# Patient Record
Sex: FEMALE | Race: WHITE | NOT HISPANIC OR LATINO | Employment: FULL TIME | URBAN - METROPOLITAN AREA
[De-identification: names, ages, dates, MRNs, and addresses within clinical notes are randomized per-mention and may not be internally consistent; named-entity substitution may affect disease eponyms.]

---

## 2019-05-13 ENCOUNTER — HOSPITAL ENCOUNTER (OUTPATIENT)
Dept: PREADMISSION TESTING | Facility: HOSPITAL | Age: 44
Discharge: HOME | End: 2019-05-13
Attending: SURGERY | Admitting: SURGERY

## 2019-10-04 PROBLEM — Z83.3 FAMILY HISTORY OF DIABETES MELLITUS: Status: ACTIVE | Noted: 2019-10-04

## 2019-10-04 RX ORDER — ESCITALOPRAM OXALATE 20 MG/1
1 TABLET ORAL DAILY
COMMUNITY
Start: 2016-09-19

## 2019-10-04 RX ORDER — IBUPROFEN AND FAMOTIDINE 26.6; 8 MG/1; MG/1
TABLET, FILM COATED ORAL
COMMUNITY
Start: 2016-09-19 | End: 2019-10-07

## 2019-10-04 RX ORDER — HYDROCHLOROTHIAZIDE 50 MG/1
1 TABLET ORAL DAILY
COMMUNITY
Start: 2016-09-19 | End: 2019-10-07

## 2019-10-04 RX ORDER — TRAZODONE HYDROCHLORIDE 100 MG/1
1 TABLET ORAL DAILY
COMMUNITY
Start: 2016-09-19

## 2019-10-07 ENCOUNTER — OFFICE VISIT (OUTPATIENT)
Dept: CARDIOLOGY | Facility: CLINIC | Age: 44
End: 2019-10-07

## 2019-10-07 VITALS
HEART RATE: 76 BPM | HEIGHT: 64 IN | BODY MASS INDEX: 42.85 KG/M2 | WEIGHT: 251 LBS | DIASTOLIC BLOOD PRESSURE: 79 MMHG | SYSTOLIC BLOOD PRESSURE: 136 MMHG

## 2019-10-07 DIAGNOSIS — E11.9 TYPE 2 DIABETES MELLITUS WITHOUT COMPLICATION, WITHOUT LONG-TERM CURRENT USE OF INSULIN (HCC): ICD-10-CM

## 2019-10-07 DIAGNOSIS — R07.2 PRECORDIAL PAIN: ICD-10-CM

## 2019-10-07 DIAGNOSIS — I49.3 PVC'S (PREMATURE VENTRICULAR CONTRACTIONS): ICD-10-CM

## 2019-10-07 DIAGNOSIS — R00.2 PALPITATIONS: ICD-10-CM

## 2019-10-07 DIAGNOSIS — R06.02 SHORTNESS OF BREATH: ICD-10-CM

## 2019-10-07 DIAGNOSIS — I10 ESSENTIAL HYPERTENSION: Primary | ICD-10-CM

## 2019-10-07 PROCEDURE — 99204 OFFICE O/P NEW MOD 45 MIN: CPT | Performed by: INTERNAL MEDICINE

## 2019-10-07 RX ORDER — POTASSIUM CHLORIDE 750 MG/1
CAPSULE, EXTENDED RELEASE ORAL
COMMUNITY
End: 2019-10-07

## 2019-10-07 RX ORDER — PROMETHAZINE HCL/CODEINE 6.25-10/5
5 SYRUP ORAL EVERY 6 HOURS
COMMUNITY
End: 2019-10-07

## 2019-10-07 RX ORDER — SPIRONOLACTONE 100 MG/1
TABLET, FILM COATED ORAL
COMMUNITY
End: 2019-10-07

## 2019-10-07 RX ORDER — ONDANSETRON 4 MG/1
TABLET, FILM COATED ORAL EVERY 8 HOURS
COMMUNITY
End: 2019-10-07

## 2019-10-07 RX ORDER — FLUCONAZOLE 200 MG/1
TABLET ORAL
COMMUNITY
End: 2019-10-07

## 2019-10-07 RX ORDER — BUMETANIDE 1 MG/1
TABLET ORAL
COMMUNITY
End: 2019-10-07

## 2019-10-07 RX ORDER — OMEPRAZOLE 20 MG/1
CAPSULE, DELAYED RELEASE ORAL
COMMUNITY

## 2019-10-07 RX ORDER — KETOROLAC TROMETHAMINE 30 MG/ML
1 INJECTION, SOLUTION INTRAMUSCULAR; INTRAVENOUS EVERY 6 HOURS
COMMUNITY
End: 2019-10-07

## 2019-10-07 RX ORDER — AMOXICILLIN AND CLAVULANATE POTASSIUM 500; 125 MG/1; MG/1
TABLET, FILM COATED ORAL
COMMUNITY
End: 2019-10-07

## 2019-10-07 RX ORDER — METHYLPREDNISOLONE 4 MG/1
TABLET ORAL
COMMUNITY
End: 2019-10-07

## 2019-10-07 RX ORDER — CEFTRIAXONE 1 G/1
1 INJECTION, POWDER, FOR SOLUTION INTRAMUSCULAR; INTRAVENOUS
COMMUNITY
End: 2019-10-07

## 2019-10-07 RX ORDER — FUROSEMIDE 20 MG/1
TABLET ORAL
COMMUNITY

## 2019-10-07 RX ORDER — CLINDAMYCIN PHOSPHATE 20 MG/G
CREAM VAGINAL
COMMUNITY
End: 2019-10-07

## 2019-10-07 RX ORDER — INDOMETHACIN 75 MG/1
CAPSULE, EXTENDED RELEASE ORAL EVERY 12 HOURS SCHEDULED
COMMUNITY
End: 2019-10-07

## 2019-10-07 RX ORDER — LIDOCAINE HYDROCHLORIDE 20 MG/ML
JELLY TOPICAL EVERY 6 HOURS SCHEDULED
COMMUNITY
End: 2019-10-07

## 2019-10-07 RX ORDER — TRIAMTERENE AND HYDROCHLOROTHIAZIDE 37.5; 25 MG/1; MG/1
TABLET ORAL
COMMUNITY
End: 2019-10-07

## 2019-10-07 RX ORDER — GENTAMICIN SULFATE 40 MG/ML
80 INJECTION, SOLUTION INTRAMUSCULAR; INTRAVENOUS
COMMUNITY
End: 2019-10-07

## 2019-10-07 RX ORDER — ACYCLOVIR 50 MG/G
OINTMENT TOPICAL
COMMUNITY

## 2019-10-07 RX ORDER — AZITHROMYCIN 250 MG/1
TABLET, FILM COATED ORAL
Refills: 0 | COMMUNITY
Start: 2019-08-01 | End: 2019-10-07

## 2019-10-07 RX ORDER — METHYLPREDNISOLONE SODIUM SUCCINATE 125 MG/2ML
125 INJECTION, POWDER, LYOPHILIZED, FOR SOLUTION INTRAMUSCULAR; INTRAVENOUS
COMMUNITY
End: 2019-10-07

## 2019-10-07 NOTE — PROGRESS NOTES
Date of Office Visit: 10/07/2019  Encounter Provider: Dr Viet Tomlinson  Place of Service: [facility] CARDIOLOGY [place]  Patient Name: Josiah Littlejohn  :1975  Nakul Harris MD    Chief Complaint   Patient presents with   •  Chest pain  Shortness of breath  Borderline diabetes mellitus     consult   • Palpitations     History of Present Illness    I am pleased to see Mrs. Littlejohn in my office today as a new consultation.    As you know, patient is 44-year-old white female whose past medical history is significant for gastroesophageal reflux disease, borderline diabetes mellitus, who is referred to me for symptom of palpitation and chest pain.    Patient reports that from last few weeks he is having episode of palpitation described as a racing of the heart.  They lasts for 5 to 10 minutes.  It is associated with dizziness and lightheadedness.  Patient also complained of chest discomfort after the episode is resolved.  Patient denies any orthopnea or PND.  Patient does complain of shortness of breath on exertion.  Patient denies any syncope or presyncope.    Patient has leg edema.  Patient is on Lasix.    Holter monitor done recently showed predominantly sinus rhythm.  Patient had 2575 premature PVCs.  There is no SVT or VT noted.  No episode of atrial fibrillation.    Patient is having symptom of palpitation along with chest discomfort.  Holter monitor demonstrated frequent PVCs.  I would recommend that patient should proceed with echocardiogram and stress test.  Further recommendation after results of these test.    No past medical history on file.      No past surgical history on file.        Current Outpatient Medications:   •  acyclovir (ZOVIRAX) 5 % ointment, acyclovir 5 % topical ointment  APPLY TO THE AFFECTED AREA(S) BY TOPICAL ROUTE EVERY 3 HOURS 6 TIMES PER DAY, Disp: , Rfl:   •  escitalopram (LEXAPRO) 20 MG tablet, 1 tablet Daily., Disp: , Rfl:   •  furosemide (LASIX) 20 MG tablet, furosemide  "20 mg tablet  TAKE ONE TABLET BY MOUTH EVERY DAY, Disp: , Rfl:   •  metFORMIN (GLUCOPHAGE) 500 MG tablet, Take 500 mg by mouth 2 (Two) Times a Day., Disp: , Rfl: 2  •  omeprazole (PRILOSEC) 20 MG capsule, Prilosec 20 mg capsule,delayed release  Take 1 capsule every day by oral route., Disp: , Rfl:   •  traZODone (DESYREL) 100 MG tablet, 1 tablet Daily., Disp: , Rfl:       Social History     Socioeconomic History   • Marital status:      Spouse name: Not on file   • Number of children: Not on file   • Years of education: Not on file   • Highest education level: Not on file   Tobacco Use   • Smoking status: Never Smoker   Substance and Sexual Activity   • Alcohol use: Yes     Frequency: Monthly or less   • Drug use: No   • Sexual activity: Defer         Review of Systems   Constitution: Negative for chills and fever.   HENT: Negative for ear discharge and nosebleeds.    Eyes: Negative for discharge and redness.   Cardiovascular: Positive for chest pain and palpitations. Negative for orthopnea, paroxysmal nocturnal dyspnea and syncope.   Respiratory: Positive for shortness of breath. Negative for cough and wheezing.    Endocrine: Negative for heat intolerance.   Skin: Negative for rash.   Musculoskeletal: Negative for arthritis and myalgias.   Gastrointestinal: Negative for abdominal pain, melena, nausea and vomiting.   Genitourinary: Negative for dysuria and hematuria.   Neurological: Negative for dizziness, light-headedness, numbness and tremors.   Psychiatric/Behavioral: Negative for depression. The patient is not nervous/anxious.        Procedures    Procedures    No orders to display           Objective:    /79   Pulse 76   Ht 162.6 cm (64\")   Wt 114 kg (251 lb)   BMI 43.08 kg/m²         Physical Exam   Constitutional: She is oriented to person, place, and time. She appears well-developed and well-nourished.   HENT:   Head: Normocephalic and atraumatic.   Eyes: No scleral icterus.   Neck: No " thyromegaly present.   Cardiovascular: Normal rate, regular rhythm and normal heart sounds. Exam reveals no gallop and no friction rub.   No murmur heard.  Pulmonary/Chest: Effort normal and breath sounds normal. No respiratory distress. She has no wheezes. She has no rales.   Abdominal: There is no tenderness.   Musculoskeletal: She exhibits no edema.   Lymphadenopathy:     She has no cervical adenopathy.   Neurological: She is alert and oriented to person, place, and time.   Skin: No rash noted. No erythema.   Psychiatric: She has a normal mood and affect.           Assessment:       Diagnosis Plan   1. Essential hypertension     2. Palpitations     3. Type 2 diabetes mellitus without complication, without long-term current use of insulin (CMS/[facility])     4. Precordial pain     5. Shortness of breath     6. PVC's (premature ventricular contractions)              Plan:       I would recommend to proceed with stress test and echocardiogram.  Holter monitor showed frequent PVCs.  If these stress test and echocardiogram is unremarkable I would recommend that patient should proceed with sleep studies to rule out obstructive sleep apnea.  Further recommendation after results of these test.

## 2023-06-05 ENCOUNTER — HOSPITAL ENCOUNTER (OUTPATIENT)
Facility: HOSPITAL | Age: 48
Discharge: HOME | End: 2023-06-05
Attending: HOSPITALIST | Admitting: INTERNAL MEDICINE
Payer: COMMERCIAL

## 2023-06-05 VITALS
HEART RATE: 97 BPM | WEIGHT: 262.35 LBS | TEMPERATURE: 97.6 F | OXYGEN SATURATION: 95 % | SYSTOLIC BLOOD PRESSURE: 127 MMHG | RESPIRATION RATE: 19 BRPM | HEIGHT: 64 IN | DIASTOLIC BLOOD PRESSURE: 86 MMHG | BODY MASS INDEX: 44.79 KG/M2

## 2023-06-05 PROBLEM — N20.0 RENAL CALCULI: Status: ACTIVE | Noted: 2023-06-05

## 2023-06-05 PROBLEM — F32.A DEPRESSION: Status: ACTIVE | Noted: 2023-06-05

## 2023-06-05 PROBLEM — E11.9 TYPE 2 DIABETES MELLITUS, WITHOUT LONG-TERM CURRENT USE OF INSULIN: Status: ACTIVE | Noted: 2023-06-05

## 2023-06-05 LAB
ANION GAP SERPL CALCULATED.3IONS-SCNC: 11 MMOL/L (ref 5–15)
BASOPHILS # BLD AUTO: 0.1 10*3/MM3 (ref 0–0.2)
BASOPHILS NFR BLD AUTO: 0.5 % (ref 0–1.5)
BUN SERPL-MCNC: 13 MG/DL (ref 6–20)
BUN/CREAT SERPL: 13.5 (ref 7–25)
CALCIUM SPEC-SCNC: 8.8 MG/DL (ref 8.6–10.5)
CHLORIDE SERPL-SCNC: 107 MMOL/L (ref 98–107)
CO2 SERPL-SCNC: 24 MMOL/L (ref 22–29)
CREAT SERPL-MCNC: 0.96 MG/DL (ref 0.57–1)
DEPRECATED RDW RBC AUTO: 49.4 FL (ref 37–54)
EGFRCR SERPLBLD CKD-EPI 2021: 73.6 ML/MIN/1.73
EOSINOPHIL # BLD AUTO: 0 10*3/MM3 (ref 0–0.4)
EOSINOPHIL NFR BLD AUTO: 0.2 % (ref 0.3–6.2)
ERYTHROCYTE [DISTWIDTH] IN BLOOD BY AUTOMATED COUNT: 18.2 % (ref 12.3–15.4)
GLUCOSE BLDC GLUCOMTR-MCNC: 113 MG/DL (ref 70–105)
GLUCOSE SERPL-MCNC: 115 MG/DL (ref 65–99)
HCT VFR BLD AUTO: 40.6 % (ref 34–46.6)
HGB BLD-MCNC: 12.6 G/DL (ref 12–15.9)
LYMPHOCYTES # BLD AUTO: 1.7 10*3/MM3 (ref 0.7–3.1)
LYMPHOCYTES NFR BLD AUTO: 12.8 % (ref 19.6–45.3)
MCH RBC QN AUTO: 23.9 PG (ref 26.6–33)
MCHC RBC AUTO-ENTMCNC: 31 G/DL (ref 31.5–35.7)
MCV RBC AUTO: 77.1 FL (ref 79–97)
MONOCYTES # BLD AUTO: 0.8 10*3/MM3 (ref 0.1–0.9)
MONOCYTES NFR BLD AUTO: 5.9 % (ref 5–12)
NEUTROPHILS NFR BLD AUTO: 11 10*3/MM3 (ref 1.7–7)
NEUTROPHILS NFR BLD AUTO: 80.6 % (ref 42.7–76)
NRBC BLD AUTO-RTO: 0.1 /100 WBC (ref 0–0.2)
PLATELET # BLD AUTO: 480 10*3/MM3 (ref 140–450)
PMV BLD AUTO: 7.2 FL (ref 6–12)
POTASSIUM SERPL-SCNC: 4.4 MMOL/L (ref 3.5–5.2)
RBC # BLD AUTO: 5.27 10*6/MM3 (ref 3.77–5.28)
SODIUM SERPL-SCNC: 142 MMOL/L (ref 136–145)
WBC NRBC COR # BLD: 13.6 10*3/MM3 (ref 3.4–10.8)

## 2023-06-05 PROCEDURE — 25010000002 PROCHLORPERAZINE 10 MG/2ML SOLUTION: Performed by: HOSPITALIST

## 2023-06-05 PROCEDURE — 96374 THER/PROPH/DIAG INJ IV PUSH: CPT

## 2023-06-05 PROCEDURE — 96375 TX/PRO/DX INJ NEW DRUG ADDON: CPT

## 2023-06-05 PROCEDURE — 82948 REAGENT STRIP/BLOOD GLUCOSE: CPT

## 2023-06-05 PROCEDURE — 85025 COMPLETE CBC W/AUTO DIFF WBC: CPT | Performed by: NURSE PRACTITIONER

## 2023-06-05 PROCEDURE — 25010000002 HYDROMORPHONE 1 MG/ML SOLUTION: Performed by: HOSPITALIST

## 2023-06-05 PROCEDURE — G0378 HOSPITAL OBSERVATION PER HR: HCPCS

## 2023-06-05 PROCEDURE — 80048 BASIC METABOLIC PNL TOTAL CA: CPT | Performed by: NURSE PRACTITIONER

## 2023-06-05 RX ORDER — IBUPROFEN 600 MG/1
1 TABLET ORAL
Status: DISCONTINUED | OUTPATIENT
Start: 2023-06-05 | End: 2023-06-05 | Stop reason: HOSPADM

## 2023-06-05 RX ORDER — DEXTROSE MONOHYDRATE 25 G/50ML
25 INJECTION, SOLUTION INTRAVENOUS
Status: DISCONTINUED | OUTPATIENT
Start: 2023-06-05 | End: 2023-06-05 | Stop reason: HOSPADM

## 2023-06-05 RX ORDER — CETIRIZINE HYDROCHLORIDE 10 MG/1
10 TABLET ORAL DAILY
COMMUNITY

## 2023-06-05 RX ORDER — FERROUS SULFATE TAB EC 324 MG (65 MG FE EQUIVALENT) 324 (65 FE) MG
324 TABLET DELAYED RESPONSE ORAL
COMMUNITY

## 2023-06-05 RX ORDER — SODIUM CHLORIDE 9 MG/ML
100 INJECTION, SOLUTION INTRAVENOUS CONTINUOUS
Status: DISCONTINUED | OUTPATIENT
Start: 2023-06-05 | End: 2023-06-05 | Stop reason: HOSPADM

## 2023-06-05 RX ORDER — PROCHLORPERAZINE EDISYLATE 5 MG/ML
2.5 INJECTION INTRAMUSCULAR; INTRAVENOUS ONCE
Status: COMPLETED | OUTPATIENT
Start: 2023-06-05 | End: 2023-06-05

## 2023-06-05 RX ORDER — NICOTINE POLACRILEX 4 MG
15 LOZENGE BUCCAL
Status: DISCONTINUED | OUTPATIENT
Start: 2023-06-05 | End: 2023-06-05 | Stop reason: HOSPADM

## 2023-06-05 RX ORDER — INSULIN LISPRO 100 [IU]/ML
2-9 INJECTION, SOLUTION INTRAVENOUS; SUBCUTANEOUS EVERY 6 HOURS SCHEDULED
Status: DISCONTINUED | OUTPATIENT
Start: 2023-06-05 | End: 2023-06-05 | Stop reason: HOSPADM

## 2023-06-05 RX ADMIN — SODIUM CHLORIDE 100 ML/HR: 9 INJECTION, SOLUTION INTRAVENOUS at 03:14

## 2023-06-05 RX ADMIN — PROCHLORPERAZINE EDISYLATE 2.5 MG: 5 INJECTION INTRAMUSCULAR; INTRAVENOUS at 02:22

## 2023-06-05 RX ADMIN — HYDROMORPHONE HYDROCHLORIDE 1 MG: 1 INJECTION, SOLUTION INTRAMUSCULAR; INTRAVENOUS; SUBCUTANEOUS at 02:21

## 2023-06-05 NOTE — CONSULTS
Urology Consult Note    Patient:Josiha Littlejohn :1975  Room:[facility]  Admit Date2023  Age:47 y.o.     SEX:female     DOS:2023     MR:[id number]     Visit:[id number]       Attending: Leeroy Dykes MD  Referring Provider: Dr. Dykes  Reason for Consultation: Left ureteral calculus    Patient Care Team:  Nakul Harris MD as PCP - General  Nakul Harris MD as PCP - Family Medicine    Chief complaint flank pain    Subjective .     History of present illness: 47-year-old woman with history of stone disease.  She had sudden onset of severe left flank pain yesterday.  She had some associated nausea and vomiting.  She denies any fevers or chills.  Patiently initially went to an outside hospital and a CT scan was performed which revealed a 7 mm obstructing stone at the left ureteropelvic junction.  She also has approximately 4 other stones in her left kidney and the largest being 6 mm in size.  Patient had a normal creatinine as well as a normal white count.  Her urine does not appear infected.    Review of Systems  10 point review of systems were reviewed and are negative except for:  Constitution:  positive for See HPI    History  Past Medical History:   Diagnosis Date    Anxiety     Depression     GERD (gastroesophageal reflux disease)      Past Surgical History:   Procedure Laterality Date    BACK SURGERY      disc removed.    COLONOSCOPY      GASTRIC BANDING      GASTRIC BANDING REMOVAL  2010    HYSTERECTOMY       Social History     Socioeconomic History    Marital status:    Tobacco Use    Smoking status: Never   Vaping Use    Vaping Use: Never used   Substance and Sexual Activity    Alcohol use: Yes    Drug use: No    Sexual activity: Defer     History reviewed. No pertinent family history.  Allergy  Allergies   Allergen Reactions    Tetanus-Diphtheria Toxoids Td Unknown (See Comments)    Nonoxynol 9 Unknown (See Comments)    Tetanus Toxoid, Adsorbed Rash     Prior to Admission  medications    Medication Sig Start Date End Date Taking? Authorizing Provider   cetirizine (zyrTEC) 10 MG tablet Take 1 tablet by mouth Daily.   Yes Shruthi Corley MD   escitalopram (LEXAPRO) 20 MG tablet 1 tablet Daily. 16  Yes Shruthi Corley MD   ferrous sulfate 324 (65 Fe) MG tablet delayed-release EC tablet Take 1 tablet by mouth Daily With Breakfast.   Yes Shruthi Corley MD   furosemide (LASIX) 20 MG tablet furosemide 20 mg tablet   TAKE ONE TABLET BY MOUTH EVERY DAY   Yes Shruthi Corley MD   omeprazole (priLOSEC) 20 MG capsule Prilosec 20 mg capsule,delayed release   Take 1 capsule every day by oral route.   Yes Shruthi Corley MD   acyclovir (ZOVIRAX) 5 % ointment acyclovir 5 % topical ointment   APPLY TO THE AFFECTED AREA(S) BY TOPICAL ROUTE EVERY 3 HOURS 6 TIMES PER DAY    Shruthi Corley MD   metFORMIN (GLUCOPHAGE) 500 MG tablet Take 500 mg by mouth 2 (Two) Times a Day. 19  Shruthi Corley MD   traZODone (DESYREL) 100 MG tablet 1 tablet Daily. 16  Shruthi Corley MD         Objective     tMax 24 hours:  Temp (24hrs), Av.9 °F (36.6 °C), Min:97.8 °F (36.6 °C), Max:97.9 °F (36.6 °C)    Vital Sign Ranges:  Temp:  [97.8 °F (36.6 °C)-97.9 °F (36.6 °C)] 97.8 °F (36.6 °C)  Heart Rate:  [89-97] 97  Resp:  [14-19] 19  BP: (131-151)/(77-81) 131/81  Intake and Output Last 3 Shifts:  No intake/output data recorded.      Physical Exam:   General Appearance: alert, appears stated age, and cooperative  Head: normocephalic, without obvious abnormality and atraumatic  Abdomen: no guarding and no rebound tenderness  Skin: no bleeding, bruising or rash  Neurologic: Mental Status orientated to person, place, time and situation    Results Review:     Lab Results (last 24 hours)       Procedure Component Value Units Date/Time    Basic Metabolic Panel [[id number]]  (Abnormal) Collected: 23 0533    Specimen: Blood Updated: 23 0656      Glucose 115 mg/dL      BUN 13 mg/dL      Creatinine 0.96 mg/dL      Sodium 142 mmol/L      Potassium 4.4 mmol/L      Chloride 107 mmol/L      CO2 24.0 mmol/L      Calcium 8.8 mg/dL      BUN/Creatinine Ratio 13.5     Anion Gap 11.0 mmol/L      eGFR 73.6 mL/min/1.73     Narrative:      GFR Normal >60  Chronic Kidney Disease <60  Kidney Failure <15      CBC & Differential [[id number]]  (Abnormal) Collected: 06/05/23 0533    Specimen: Blood Updated: 06/05/23 0640    Narrative:      The following orders were created for panel order CBC & Differential.  Procedure                               Abnormality         Status                     ---------                               -----------         ------                     CBC Auto Differential[[id number]]        Abnormal            Final result                 Please view results for these tests on the individual orders.    CBC Auto Differential [[id number]]  (Abnormal) Collected: 06/05/23 0533    Specimen: Blood Updated: 06/05/23 0640     WBC 13.60 10*3/mm3      RBC 5.27 10*6/mm3      Hemoglobin 12.6 g/dL      Hematocrit 40.6 %      MCV 77.1 fL      MCH 23.9 pg      MCHC 31.0 g/dL      RDW 18.2 %      RDW-SD 49.4 fl      MPV 7.2 fL      Platelets 480 10*3/mm3      Neutrophil % 80.6 %      Lymphocyte % 12.8 %      Monocyte % 5.9 %      Eosinophil % 0.2 %      Basophil % 0.5 %      Neutrophils, Absolute 11.00 10*3/mm3      Lymphocytes, Absolute 1.70 10*3/mm3      Monocytes, Absolute 0.80 10*3/mm3      Eosinophils, Absolute 0.00 10*3/mm3      Basophils, Absolute 0.10 10*3/mm3      nRBC 0.1 /100 WBC     POC Glucose Once [[id number]]  (Abnormal) Collected: 06/05/23 0549    Specimen: Blood Updated: 06/05/23 0550     Glucose 113 mg/dL      Comment: Serial Number: [id number]:  [id number]              No results found for: URINECX     Imaging Results (Last 7 Days)       ** No results found for the last 168 hours. **            Inpatient Meds:   Scheduled Meds:insulin lispro,  2-9 Units, Subcutaneous, Q6H       Continuous Infusions:sodium chloride, 100 mL/hr, Last Rate: 100 mL/hr (06/05/23 0314)       PRN Meds:.  dextrose    dextrose    glucagon (human recombinant)    HYDROmorphone      Assessment & Plan     Principal Problem:    Renal calculi  Active Problems:    Essential hypertension    Type 2 diabetes mellitus, without long-term current use of insulin    Depression    Obstructing proximal left ureteral calculus  Left renal calculi    Plan  I discussed treatment options with the patient.  She would like to proceed with extracorporal shockwave lithotripsy.  She does understand that we would place a stent.  We discussed the risk, benefits, alternatives of the procedures.  We are going to arrange for ESWL at [facility] outpatient surgery this afternoon.      I discussed the patient's findings and my recommendations with patient and nursing staff    Thank you for this  consult    Herbert Wolfe MD  06/05/23  07:55 EDT

## 2023-06-05 NOTE — PLAN OF CARE
Patient independently ambulates around unit in rubalcava with a steady gait.Goal Outcome Evaluation:Family notified and updated of plan of care.Hourly rounding completed this shift, no complaints or needs voiced. Patient has appointment at [facility] at 11:00 am must stay up NPO.

## 2023-06-05 NOTE — H&P
[facility] Medicine Services      Patient Name: Josiah Littlejohn  : 1975  MRN: [id number]  Primary Care Physician:  Nakul Harris MD  Date of admission: 2023      Subjective      Chief Complaint: Left-sided flank pain.    History of Present Illness: Josiah Littlejohn is a 47 y.o. female with a past medical history of depression, anxiety, hypertension, diabetes mellitus type 2 and renal calculi who presented to [facility] on 2023 upon transfer from [facility] emergency department where she presented complaining of left-sided flank pain 3 hours prior to admission.  She reports nausea and vomiting.  CT abdomen and pelvis without contrast from [facility] showed a 7 mm obstructing renal stone with mild hydronephrosis.  Urinalysis was negative for infection WBC not elevated other labs unremarkable.  Urology was consulted from [facility] and agreed to accept consult.  She was was given IV fluid bolus, 0.3 mg Flomax, Zofran, ketorolac and morphine and Dilaudid at [facility].  She will be admitted for further evaluation and treatment.  Review of Systems   Constitutional: Negative.   HENT: Negative.     Eyes: Negative.    Cardiovascular: Negative.    Respiratory: Negative.     Endocrine: Negative.    Skin: Negative.    Gastrointestinal:  Positive for nausea and vomiting.   Genitourinary:  Positive for flank pain.   Neurological: Negative.    Psychiatric/Behavioral: Negative.     Allergic/Immunologic: Negative.    All other systems reviewed and are negative.     Personal History     Past Medical History:   Diagnosis Date    Anxiety     Depression     GERD (gastroesophageal reflux disease)        Past Surgical History:   Procedure Laterality Date    BACK SURGERY      disc removed.    COLONOSCOPY      GASTRIC BANDING      GASTRIC BANDING REMOVAL      HYSTERECTOMY         Family History: family history is not on file. Otherwise pertinent FHx was  reviewed and not pertinent to current issue.    Social History:  reports that she has never smoked. She does not have any smokeless tobacco history on file. She reports current alcohol use. She reports that she does not use drugs.    Home Medications:  Prior to Admission Medications       Prescriptions Last Dose Informant Patient Reported? Taking?    cetirizine (zyrTEC) 10 MG tablet 6/4/2023  Yes Yes    Take 1 tablet by mouth Daily.    escitalopram (LEXAPRO) 20 MG tablet 6/4/2023  Yes Yes    1 tablet Daily.    ferrous sulfate 324 (65 Fe) MG tablet delayed-release EC tablet 6/4/2023  Yes Yes    Take 1 tablet by mouth Daily With Breakfast.    furosemide (LASIX) 20 MG tablet 6/4/2023  Yes Yes    furosemide 20 mg tablet   TAKE ONE TABLET BY MOUTH EVERY DAY    omeprazole (priLOSEC) 20 MG capsule 6/4/2023  Yes Yes    Prilosec 20 mg capsule,delayed release   Take 1 capsule every day by oral route.    acyclovir (ZOVIRAX) 5 % ointment   Yes No    acyclovir 5 % topical ointment   APPLY TO THE AFFECTED AREA(S) BY TOPICAL ROUTE EVERY 3 HOURS 6 TIMES PER DAY              Allergies:  Allergies   Allergen Reactions    Tetanus-Diphtheria Toxoids Td Unknown (See Comments)    Nonoxynol 9 Unknown (See Comments)    Tetanus Toxoid, Adsorbed Rash       Objective      Vitals:   Temp:  [97.9 °F (36.6 °C)] 97.9 °F (36.6 °C)  Heart Rate:  [89] 89  Resp:  [14] 14  BP: (151)/(77) 151/77    Physical Exam  Vitals reviewed.   Constitutional:       Appearance: Normal appearance. She is obese.   HENT:      Head: Normocephalic and atraumatic.      Right Ear: External ear normal.      Left Ear: External ear normal.      Nose: Nose normal.      Mouth/Throat:      Mouth: Mucous membranes are moist.   Eyes:      Extraocular Movements: Extraocular movements intact.   Cardiovascular:      Rate and Rhythm: Normal rate and regular rhythm.      Pulses: Normal pulses.      Heart sounds: Normal heart sounds.   Pulmonary:      Effort: Pulmonary effort is  "normal.      Breath sounds: Normal breath sounds.   Abdominal:      Palpations: Abdomen is soft.   Genitourinary:     Comments: deferred  Musculoskeletal:      Cervical back: Normal range of motion and neck supple.   Skin:     General: Skin is warm and dry.   Neurological:      General: No focal deficit present.      Mental Status: She is alert and oriented to person, place, and time.   Psychiatric:         Mood and Affect: Mood normal.         Behavior: Behavior normal.         Thought Content: Thought content normal.         Judgment: Judgment normal.        Result Review    Result Review:  I have personally reviewed the results from the time of this admission to 6/5/2023 03:08 EDT and agree with these findings:  [x]  Laboratory  []  Microbiology  [x]  Radiology  []  EKG/Telemetry   []  Cardiology/Vascular   []  Pathology  [x]  Old records  []  Other:  Most notable findings include: Labs from [facility] emergency department show a glucose of 102 creatinine 0.78 BUN 11 WBC 9.6 hemoglobin 14.1 urine drug screen positive for cannabinoids, urinalysis negative for infection,    CT abdomen and pelvis per radiology without contrast:    \"1.  7 mm stone at the left ureteropelvic junction resulting in mild left renal hydronephrosis.    2.  Nonobstructing left renal calculi)      Assessment & Plan        Active Hospital Problems:  Active Hospital Problems    Diagnosis     **Renal calculi     Type 2 diabetes mellitus, without long-term current use of insulin     Depression     Essential hypertension      Plan:    Renal calculi, 7 mm stone left UPJ junction with mild left renal hydronephrosis per CT scan, n.p.o. except ice chips, IV fluid hydration, urology consulted, strain all urine, 0.5 mg Dilaudid every 4 hours as needed, reported Zofran ineffective, 10 mg Compazine ordered x1    Type 2 diabetes mellitus glucose at outlying facility 102, hold p.o. meds while inpatient add SSI as needed with Accu-Cheks every 6 " hours    Depression, home meds unverified at this time reorder pending verification    Essential hypertension, home meds unverified at this time reorder pending verification from pharmacy, monitor BP Will add antihypertensive meds if needed    Obesity lifestyle management education        DVT prophylaxis:  Mechanical DVT prophylaxis orders are present.    CODE STATUS:    Code Status (Patient has no pulse and is not breathing): CPR (Attempt to Resuscitate)  Medical Interventions (Patient has pulse or is breathing): Full Support    Admission Status:  I believe this patient meets observation  status.    I discussed the patient's findings and my recommendations with patient.    This patient has been examined wearing appropriate Personal Protective Equipment     . 06/05/23      Signature: Electronically signed by LESLYE Batista, 06/05/23, 3:13 AM EDT.

## 2023-06-05 NOTE — PLAN OF CARE
Goal Outcome Evaluation:                 Patient independently ambulates around unit in rubalcava with a steady gait.Hourly rounding completed this shift, no complaints or needs voiced. Patient is going to [facility] for Lithotripsy procedure that cannot be completed here at this time. MD\Family aware.

## 2023-06-05 NOTE — PLAN OF CARE
Goal Outcome Evaluation:  Plan of Care Reviewed With: patient            Patient was a direct admit from [facility].  Patient came to the [facility] floor at 0200am.  Patients pain was treated per the MAR. The patient is A & O  X4.  The patient was made NPO and Urology consult was called in.  The patient has been sleeping after getting settled in.  Will continue with patients care.

## 2023-06-05 NOTE — CASE MANAGEMENT/SOCIAL WORK
Case Management Discharge Note      Final Note: home         Selected Continued Care - Discharged on 6/5/2023 Admission date: 6/5/2023 - Discharge disposition: Home or Self Care                  Transportation Services  Private: Car    Final Discharge Disposition Code: 01 - home or self-care

## 2023-06-05 NOTE — PLAN OF CARE
Goal Outcome Evaluation:         Patient will be discharged to [facility] for surgery with Dr. Wolfe. Patient is transported VIA private car.

## 2023-06-05 NOTE — DISCHARGE SUMMARY
[facility] Medicine Services  DISCHARGE SUMMARY    Patient Name: Josiah Littlejohn  : 1975  MRN: [id number]    Discharge condition: Stable  Date of Admission: 2023  Discharge Diagnosis: Ureterolithiasis pending lithotripsy  Date of Discharge: 2023  Primary Care Physician: Nakul Hraris MD      Presenting Problem:   Renal calculi [N20.0]    Active and Resolved Hospital Problems:  Active Hospital Problems    Diagnosis POA    **Renal calculi [N20.0] Yes    Type 2 diabetes mellitus, without long-term current use of insulin [E11.9] Yes    Depression [F32.A] Yes    Essential hypertension [I10] Yes      Resolved Hospital Problems   No resolved problems to display.         Hospital Course     Hospital Course:  Josiah Littlejohn is a 47 y.o. female who was admitted to our facility from [facility] for urology evaluation.  Urology saw her and stated that she needs to be discharged immediately to go to [facility] for lithotripsy.  Discharge orders were placed per urology recommendations and patient was sent to [place] for lithotripsy          Reasons For Change In Medications and Indications for New Medications:      Day of Discharge     Vital Signs:  Temp:  [97.8 °F (36.6 °C)-97.9 °F (36.6 °C)] 97.8 °F (36.6 °C)  Heart Rate:  [89-97] 97  Resp:  [14-19] 19  BP: (131-151)/(77-81) 131/81  Flow (L/min):  [2] 2        Pertinent  and/or Most Recent Results     LAB RESULTS:      Lab 23  0533   WBC 13.60*   HEMOGLOBIN 12.6   HEMATOCRIT 40.6   PLATELETS 480*   NEUTROS ABS 11.00*   LYMPHS ABS 1.70   MONOS ABS 0.80   EOS ABS 0.00   MCV 77.1*         Lab 23  0533   SODIUM 142   POTASSIUM 4.4   CHLORIDE 107   CO2 24.0   ANION GAP 11.0   BUN 13   CREATININE 0.96   EGFR 73.6   GLUCOSE 115*   CALCIUM 8.8                         Brief Urine Lab Results       None          Microbiology Results (last 10 days)       ** No results found for the last 240 hours. **                          Results for orders placed in visit on 10/07/19    SCANNED - ECHOCARDIOGRAM      Labs Pending at Discharge:      Procedures Performed           Consults:   Consults       Date and Time Order Name Status Description    6/5/2023  2:30 AM Inpatient Urology Consult Completed               Discharge Details        Discharge Medications        Continue These Medications        Instructions Start Date   acyclovir 5 % ointment  Commonly known as: ZOVIRAX   acyclovir 5 % topical ointment   APPLY TO THE AFFECTED AREA(S) BY TOPICAL ROUTE EVERY 3 HOURS 6 TIMES PER DAY      cetirizine 10 MG tablet  Commonly known as: zyrTEC   10 mg, Oral, Daily      escitalopram 20 MG tablet  Commonly known as: LEXAPRO   1 tablet, Daily      ferrous sulfate 324 (65 Fe) MG tablet delayed-release EC tablet   324 mg, Oral, Daily With Breakfast      furosemide 20 MG tablet  Commonly known as: LASIX   furosemide 20 mg tablet   TAKE ONE TABLET BY MOUTH EVERY DAY      omeprazole 20 MG capsule  Commonly known as: priLOSEC   Prilosec 20 mg capsule,delayed release   Take 1 capsule every day by oral route.               Allergies   Allergen Reactions    Tetanus-Diphtheria Toxoids Td Unknown (See Comments)    Nonoxynol 9 Unknown (See Comments)    Tetanus Toxoid, Adsorbed Rash         Discharge Disposition:   [facility] lithotripsy    Diet:  Hospital:  Diet Order   Procedures    NPO Diet NPO Type: Ice Chips         Discharge Activity:   Activity Instructions    As Tolerated               CODE STATUS:  Code Status and Medical Interventions:   Ordered at: 06/05/23 0230     Code Status (Patient has no pulse and is not breathing):    CPR (Attempt to Resuscitate)     Medical Interventions (Patient has pulse or is breathing):    Full Support        06/05/23      Signature: Leeroy Dykes MD

## 2024-08-15 ENCOUNTER — TRANSCRIBE ORDERS (OUTPATIENT)
Dept: ADMINISTRATIVE | Facility: HOSPITAL | Age: 49
End: 2024-08-15
Payer: COMMERCIAL

## 2024-08-15 ENCOUNTER — LAB (OUTPATIENT)
Dept: LAB | Facility: HOSPITAL | Age: 49
End: 2024-08-15
Payer: COMMERCIAL

## 2024-08-15 ENCOUNTER — HOSPITAL ENCOUNTER (OUTPATIENT)
Dept: CARDIOLOGY | Facility: HOSPITAL | Age: 49
Discharge: HOME | End: 2024-08-15
Payer: COMMERCIAL

## 2024-08-15 DIAGNOSIS — N20.0 KIDNEY STONES: ICD-10-CM

## 2024-08-15 DIAGNOSIS — N20.0 KIDNEY STONES: Primary | ICD-10-CM

## 2024-08-15 LAB
QT INTERVAL: 399 MS
QTC INTERVAL: 429 MS

## 2024-08-15 PROCEDURE — 93005 ELECTROCARDIOGRAM TRACING: CPT | Performed by: UROLOGY

## 2024-08-15 PROCEDURE — 36415 COLL VENOUS BLD VENIPUNCTURE: CPT

## 2024-08-15 PROCEDURE — 85025 COMPLETE CBC W/AUTO DIFF WBC: CPT

## 2024-08-15 PROCEDURE — 80048 BASIC METABOLIC PNL TOTAL CA: CPT

## 2024-08-16 LAB
ANION GAP SERPL CALCULATED.3IONS-SCNC: 13.4 MMOL/L (ref 5–15)
BASOPHILS # BLD AUTO: 0.1 10*3/MM3 (ref 0–0.2)
BASOPHILS NFR BLD AUTO: 1.2 % (ref 0–1.5)
BUN SERPL-MCNC: 8 MG/DL (ref 6–20)
BUN/CREAT SERPL: 9.9 (ref 7–25)
CALCIUM SPEC-SCNC: 9.7 MG/DL (ref 8.6–10.5)
CHLORIDE SERPL-SCNC: 103 MMOL/L (ref 98–107)
CO2 SERPL-SCNC: 25.6 MMOL/L (ref 22–29)
CREAT SERPL-MCNC: 0.81 MG/DL (ref 0.57–1)
DEPRECATED RDW RBC AUTO: 42.9 FL (ref 37–54)
EGFRCR SERPLBLD CKD-EPI 2021: 89.1 ML/MIN/1.73
EOSINOPHIL # BLD AUTO: 0.2 10*3/MM3 (ref 0–0.4)
EOSINOPHIL NFR BLD AUTO: 2.4 % (ref 0.3–6.2)
ERYTHROCYTE [DISTWIDTH] IN BLOOD BY AUTOMATED COUNT: 14.9 % (ref 12.3–15.4)
GLUCOSE SERPL-MCNC: 70 MG/DL (ref 65–99)
HCT VFR BLD AUTO: 44.4 % (ref 34–46.6)
HGB BLD-MCNC: 14.5 G/DL (ref 12–15.9)
IMM GRANULOCYTES # BLD AUTO: 0.02 10*3/MM3 (ref 0–0.05)
IMM GRANULOCYTES NFR BLD AUTO: 0.2 % (ref 0–0.5)
LYMPHOCYTES # BLD AUTO: 3.55 10*3/MM3 (ref 0.7–3.1)
LYMPHOCYTES NFR BLD AUTO: 43.2 % (ref 19.6–45.3)
MCH RBC QN AUTO: 26.1 PG (ref 26.6–33)
MCHC RBC AUTO-ENTMCNC: 32.7 G/DL (ref 31.5–35.7)
MCV RBC AUTO: 79.9 FL (ref 79–97)
MONOCYTES # BLD AUTO: 0.68 10*3/MM3 (ref 0.1–0.9)
MONOCYTES NFR BLD AUTO: 8.3 % (ref 5–12)
NEUTROPHILS NFR BLD AUTO: 3.67 10*3/MM3 (ref 1.7–7)
NEUTROPHILS NFR BLD AUTO: 44.7 % (ref 42.7–76)
NRBC BLD AUTO-RTO: 0 /100 WBC (ref 0–0.2)
PLATELET # BLD AUTO: 335 10*3/MM3 (ref 140–450)
PMV BLD AUTO: 9 FL (ref 6–12)
POTASSIUM SERPL-SCNC: 3.8 MMOL/L (ref 3.5–5.2)
RBC # BLD AUTO: 5.56 10*6/MM3 (ref 3.77–5.28)
SODIUM SERPL-SCNC: 142 MMOL/L (ref 136–145)
WBC NRBC COR # BLD AUTO: 8.22 10*3/MM3 (ref 3.4–10.8)

## 2024-08-22 LAB
QT INTERVAL: 399 MS
QTC INTERVAL: 429 MS

## 2024-11-14 PROCEDURE — 82365 CALCULUS SPECTROSCOPY: CPT | Performed by: UROLOGY

## 2024-11-15 ENCOUNTER — LAB REQUISITION (OUTPATIENT)
Dept: LAB | Facility: HOSPITAL | Age: 49
End: 2024-11-15
Payer: COMMERCIAL

## 2024-11-15 DIAGNOSIS — N20.0 CALCULUS OF KIDNEY: ICD-10-CM

## 2024-11-21 LAB
COLOR STONE: NORMAL
COM MFR STONE: 100 %
COMPN STONE: NORMAL
LABORATORY COMMENT REPORT: NORMAL
LABORATORY COMMENT REPORT: NORMAL
Lab: NORMAL
Lab: NORMAL
PHOTO: NORMAL
SIZE STONE: NORMAL MM
SPEC SOURCE SUBJ: NORMAL
WT STONE: 49 MG